# Patient Record
Sex: MALE | Race: WHITE | Employment: OTHER | URBAN - METROPOLITAN AREA
[De-identification: names, ages, dates, MRNs, and addresses within clinical notes are randomized per-mention and may not be internally consistent; named-entity substitution may affect disease eponyms.]

---

## 2022-06-04 ENCOUNTER — HOSPITAL ENCOUNTER (EMERGENCY)
Dept: CT IMAGING | Age: 82
Discharge: HOME | End: 2022-06-07
Payer: MEDICARE

## 2022-06-04 ENCOUNTER — HOSPITAL ENCOUNTER (EMERGENCY)
Age: 82
Discharge: HOME | End: 2022-06-04
Attending: EMERGENCY MEDICINE | Admitting: EMERGENCY MEDICINE
Payer: MEDICARE

## 2022-06-04 VITALS
RESPIRATION RATE: 18 BRPM | DIASTOLIC BLOOD PRESSURE: 60 MMHG | TEMPERATURE: 98.3 F | HEART RATE: 78 BPM | OXYGEN SATURATION: 95 % | SYSTOLIC BLOOD PRESSURE: 96 MMHG

## 2022-06-04 DIAGNOSIS — R31.0 GROSS HEMATURIA: Primary | ICD-10-CM

## 2022-06-04 LAB
ALBUMIN SERPL-MCNC: 3 G/DL (ref 3.2–4.6)
ALBUMIN/GLOB SERPL: 0.7 {RATIO} (ref 1.2–3.5)
ALP SERPL-CCNC: 202 U/L (ref 50–136)
ALT SERPL-CCNC: 21 U/L (ref 12–65)
ANION GAP SERPL CALC-SCNC: 5 MMOL/L (ref 7–16)
APPEARANCE UR: ABNORMAL
AST SERPL-CCNC: 24 U/L (ref 15–37)
BACTERIA URNS QL MICRO: ABNORMAL /HPF
BASOPHILS # BLD: 0 K/UL (ref 0–0.2)
BASOPHILS NFR BLD: 1 % (ref 0–2)
BILIRUB SERPL-MCNC: 1.3 MG/DL (ref 0.2–1.1)
BILIRUB UR QL: ABNORMAL
BUN SERPL-MCNC: 19 MG/DL (ref 8–23)
CALCIUM SERPL-MCNC: 9.3 MG/DL (ref 8.3–10.4)
CASTS URNS QL MICRO: ABNORMAL /LPF
CHLORIDE SERPL-SCNC: 107 MMOL/L (ref 98–107)
CO2 SERPL-SCNC: 30 MMOL/L (ref 21–32)
COLOR UR: ABNORMAL
CREAT SERPL-MCNC: 1.51 MG/DL (ref 0.8–1.5)
DIFFERENTIAL METHOD BLD: ABNORMAL
EOSINOPHIL # BLD: 0.2 K/UL (ref 0–0.8)
EOSINOPHIL NFR BLD: 3 % (ref 0.5–7.8)
EPI CELLS #/AREA URNS HPF: ABNORMAL /HPF
ERYTHROCYTE [DISTWIDTH] IN BLOOD BY AUTOMATED COUNT: 14.1 % (ref 11.9–14.6)
GLOBULIN SER CALC-MCNC: 4.1 G/DL (ref 2.3–3.5)
GLUCOSE SERPL-MCNC: 119 MG/DL (ref 65–100)
GLUCOSE UR STRIP.AUTO-MCNC: NEGATIVE MG/DL
HCT VFR BLD AUTO: 45.1 % (ref 41.1–50.3)
HGB BLD-MCNC: 14.7 G/DL (ref 13.6–17.2)
HGB UR QL STRIP: ABNORMAL
IMM GRANULOCYTES # BLD AUTO: 0 K/UL (ref 0–0.5)
IMM GRANULOCYTES NFR BLD AUTO: 0 % (ref 0–5)
KETONES UR QL STRIP.AUTO: ABNORMAL MG/DL
LEUKOCYTE ESTERASE UR QL STRIP.AUTO: ABNORMAL
LYMPHOCYTES # BLD: 1 K/UL (ref 0.5–4.6)
LYMPHOCYTES NFR BLD: 17 % (ref 13–44)
MCH RBC QN AUTO: 31.3 PG (ref 26.1–32.9)
MCHC RBC AUTO-ENTMCNC: 32.6 G/DL (ref 31.4–35)
MCV RBC AUTO: 96 FL (ref 79.6–97.8)
MONOCYTES # BLD: 0.6 K/UL (ref 0.1–1.3)
MONOCYTES NFR BLD: 11 % (ref 4–12)
NEUTS SEG # BLD: 4.2 K/UL (ref 1.7–8.2)
NEUTS SEG NFR BLD: 69 % (ref 43–78)
NITRITE UR QL STRIP.AUTO: NEGATIVE
NRBC # BLD: 0 K/UL (ref 0–0.2)
OTHER OBSERVATIONS: ABNORMAL
PH UR STRIP: 6 [PH] (ref 5–9)
PLATELET # BLD AUTO: 128 K/UL (ref 150–450)
PMV BLD AUTO: 11.8 FL (ref 9.4–12.3)
POTASSIUM SERPL-SCNC: 4.4 MMOL/L (ref 3.5–5.1)
PROT SERPL-MCNC: 7.1 G/DL (ref 6.3–8.2)
PROT UR STRIP-MCNC: 100 MG/DL
RBC # BLD AUTO: 4.7 M/UL (ref 4.23–5.6)
RBC #/AREA URNS HPF: >100 /HPF
SODIUM SERPL-SCNC: 142 MMOL/L (ref 136–145)
SP GR UR REFRACTOMETRY: 1.03 (ref 1–1.02)
UROBILINOGEN UR QL STRIP.AUTO: 0.2 EU/DL (ref 0.2–1)
WBC # BLD AUTO: 6.1 K/UL (ref 4.3–11.1)
WBC URNS QL MICRO: ABNORMAL /HPF

## 2022-06-04 PROCEDURE — 80053 COMPREHEN METABOLIC PANEL: CPT

## 2022-06-04 PROCEDURE — 87086 URINE CULTURE/COLONY COUNT: CPT

## 2022-06-04 PROCEDURE — 99284 EMERGENCY DEPT VISIT MOD MDM: CPT

## 2022-06-04 PROCEDURE — 85025 COMPLETE CBC W/AUTO DIFF WBC: CPT

## 2022-06-04 PROCEDURE — 6370000000 HC RX 637 (ALT 250 FOR IP): Performed by: EMERGENCY MEDICINE

## 2022-06-04 PROCEDURE — 74176 CT ABD & PELVIS W/O CONTRAST: CPT

## 2022-06-04 PROCEDURE — 81003 URINALYSIS AUTO W/O SCOPE: CPT

## 2022-06-04 RX ORDER — SULFAMETHOXAZOLE AND TRIMETHOPRIM 800; 160 MG/1; MG/1
1 TABLET ORAL 2 TIMES DAILY
Qty: 14 TABLET | Refills: 0 | Status: SHIPPED | OUTPATIENT
Start: 2022-06-04 | End: 2022-06-11

## 2022-06-04 RX ORDER — SULFAMETHOXAZOLE AND TRIMETHOPRIM 800; 160 MG/1; MG/1
1 TABLET ORAL
Status: COMPLETED | OUTPATIENT
Start: 2022-06-04 | End: 2022-06-04

## 2022-06-04 RX ADMIN — SULFAMETHOXAZOLE AND TRIMETHOPRIM 1 TABLET: 800; 160 TABLET ORAL at 22:32

## 2022-06-04 ASSESSMENT — PAIN SCALES - GENERAL: PAINLEVEL_OUTOF10: 0

## 2022-06-04 ASSESSMENT — ENCOUNTER SYMPTOMS
VOMITING: 0
ABDOMINAL PAIN: 0

## 2022-06-04 ASSESSMENT — PAIN - FUNCTIONAL ASSESSMENT: PAIN_FUNCTIONAL_ASSESSMENT: 0-10

## 2022-06-04 NOTE — ED TRIAGE NOTES
Pt c/p urinary frequency and hematuria , x 1 week, pt took old rx of possibly keflex  Without relief of symptoms

## 2022-06-04 NOTE — ED PROVIDER NOTES
[facility] Emergency Department Provider Note                   PCP:                Brody Crawford MD               Age: 80 y.o. Sex: male     No diagnosis found. DISPOSITION         New Prescriptions    No medications on file       Orders Placed This Encounter   Procedures    Culture, Urine    Urinalysis w rflx microscopic    CBC with Auto Differential    Comprehensive Metabolic Panel    Insert peripheral IV        MDM  Number of Diagnoses or Management Options  Diagnosis management comments: Painless hematuria and patient is on Eliquis. Possibility UTI, prostate issues, kidney stone or renal tumor or bladder tumor. Screening blood work. Discussed with family imaging. Amount and/or Complexity of Data Reviewed  Clinical lab tests: ordered and reviewed  Tests in the radiology section of CPT®: ordered and reviewed    Risk of Complications, Morbidity, and/or Mortality  Presenting problems: moderate  Diagnostic procedures: low  Management options: low         Amy Zayas is a 80 y.o. male who presents to the Emergency Department with chief complaint of    Chief Complaint   Patient presents with    Hematuria      80-year-old male brought in by daughter. Patient states dark urine and dysuria for 10 days. Suspect he had UTI.  UTI in the past.  Approximately 6 months ago he took Keflex but did not finish it. He started taking the leftover Keflex and finished a few days ago. The hematuria is persisted. No fever chills. No vomiting. No abnormal bleeding otherwise. No chest pain shortness of breath. Patient has remote history of kidney stones. Also history of renal cyst that had surgery on the left years ago. The history is provided by the patient. Hematuria  This is a new problem. The current episode started in the past 7 days. The problem is unchanged. He describes the hematuria as gross hematuria.  The hematuria occurs throughout his entire urinary stream. He reports no clotting in his urine stream. He is experiencing no pain. He describes his urine color as tea colored. Irritative symptoms include frequency. Obstructive symptoms do not include straining. Associated symptoms include dysuria. Pertinent negatives include no abdominal pain, chills, fever, flank pain, inability to urinate or vomiting. His past medical history is significant for hypertension and kidney stones. There is no history of prostatitis. All other systems reviewed and are negative. Review of Systems   Constitutional: Negative for chills and fever. Gastrointestinal: Negative for abdominal pain and vomiting. Genitourinary: Positive for dysuria, frequency and hematuria. Negative for flank pain and penile pain. All other systems reviewed and are negative.       Past Medical History:   Diagnosis Date    Abdominal pain, other specified site     Acquired cyst of kidney     Chronic kidney disease     cyst removed from L kidney 1979    Diabetes mellitus ([facility] [address]     history of \"borderline\" per pt, but no treatmentt    Dysplasia of prostate     Elevated prostate specific antigen (PSA)     Hyperlipidemia     Hypertension         Past Surgical History:   Procedure Laterality Date    HEENT      wire repair lower jaw fx  r/t trauma    LAP,CHOLECYSTECTOMY      ORTHOPEDIC SURGERY      back surgery    ORTHOPEDIC SURGERY      neck surgery    ORTHOPEDIC SURGERY      Lumbar fusion (exact # unknown)    OTHER SURGICAL HISTORY      atrial fib        Family History   Problem Relation Age of Onset    Hypertension Mother            Social Connections:     Frequency of Communication with Friends and Family: Not on file    Frequency of Social Gatherings with Friends and Family: Not on file    Attends Gnosticism Services: Not on file    Active Member of Clubs or Organizations: Not on file    Attends Club or Organization Meetings: Not on file    Marital Status: Not on file        No Known Allergies     Vitals signs and nursing note reviewed. Patient Vitals for the past 4 hrs:   Temp Pulse Resp BP SpO2   06/04/22 1824 98.3 °F (36.8 °C) 79 18 105/61 98 %          Physical Exam  Vitals and nursing note reviewed. Constitutional:       Appearance: He is not ill-appearing. Eyes:      General: No scleral icterus. Conjunctiva/sclera: Conjunctivae normal.   Cardiovascular:      Rate and Rhythm: Normal rate and regular rhythm. Pulmonary:      Effort: Pulmonary effort is normal.      Breath sounds: Normal breath sounds. Abdominal:      General: Abdomen is flat. Tenderness: There is no abdominal tenderness. There is no right CVA tenderness or left CVA tenderness. Skin:     General: Skin is warm and dry. Neurological:      Mental Status: He is alert. Procedures    Labs Reviewed   CULTURE, URINE   URINALYSIS   CBC WITH AUTO DIFFERENTIAL   COMPREHENSIVE METABOLIC PANEL        No orders to display            Elena Coma Scale  Eye Opening: Spontaneous  Best Verbal Response: Oriented  Best Motor Response: Obeys commands  [place] Coma Scale Score: 15             .edl  CT ABDOMEN PELVIS RENAL STONE Additional Contrast? None    Result Date: 6/4/2022  EXAM: Noncontrast CT abdomen and pelvis. INDICATION: Abdominal pain. COMPARISON: None. TECHNIQUE: Axial CT images of the abdomen and pelvis were obtained without IV contrast. Radiation dose reduction techniques were used for this study. Our CT scanners use one or all of the following:  Automated exposure control, adjustment of the mA or kV according to patient size, iterative reconstruction. FINDINGS: - Liver: Within normal limits. - Gallbladder and bile ducts: The gallbladder is absent. There is a small amount of pneumobilia, with no biliary tract dilatation. - Spleen: Within normal limits. - Urinary tract: Prostate gland is enlarged, measuring 5.7 x 6.4 x 5.1 cm. There is a lobulated 4 cm diverticulum along the dome of the bladder.  There are small cysts and stones in both kidneys, the largest stone on the left measuring 4 mm. No ureter stone or hydronephrosis is seen. - Adrenals: Within normal limits. - Pancreas: Within normal limits. - Gastrointestinal tract: There is colonic diverticulosis, without evidence of acute diverticulitis. The small bowel loops and appendix are within normal limits. - Retroperitoneum: Moderate abdominal aortic atherosclerotic calcification, with no aneurysmal dilatation. - Peritoneal cavity and abdominal wall: No ascites or free intraperitoneal air. - Pelvis: There is a small fat-containing right inguinal hernia. - Spine/bones: No acute process. - Other comments: None. 1. Colonic diverticulosis, without acute diverticulitis. 2. Enlarged prostate gland with a 4 cm diverticulum along the bladder dome. 3. Small bilateral kidney stones, with no ureter stone or hydronephrosis. 4. Prior cholecystectomy. A small amount of pneumobilia, without biliary tract dilatation, may be iatrogenic although cholangitis is not excluded. Voice dictation software was used during the making of this note. This software is not perfect and grammatical and other typographical errors may be present. This note has not been completely proofread for errors. Barby Forman MD  06/04/22 184  Patient will hold Eliquis for 48 hours. Antibiotics. Call urologist for appointment recheck.      Barby Forman MD  06/04/22 2018       Barby Forman MD  06/04/22 [id number]

## 2022-06-05 NOTE — ED NOTES
I have reviewed discharge instructions with the patient. The patient verbalized understanding. Patient left ED via Discharge Method: ambulatory to Home with family  Opportunity for questions and clarification provided. No acute distress present      Patient given 1 scripts. To continue your aftercare when you leave the hospital, you may receive an automated call from our care team to check in on how you are doing. This is a free service and part of our promise to provide the best care and service to meet your aftercare needs.  If you have questions, or wish to unsubscribe from this service please call [phone]. Thank you for Choosing our [facility] Emergency Department.         Lovely Story RN  06/04/22 5832

## 2022-06-07 LAB
BACTERIA SPEC CULT: NORMAL
SERVICE CMNT-IMP: NORMAL

## 2022-06-09 ENCOUNTER — OFFICE VISIT (OUTPATIENT)
Dept: UROLOGY | Age: 82
End: 2022-06-09
Payer: MEDICARE

## 2022-06-09 DIAGNOSIS — N20.0 KIDNEY STONE: Primary | ICD-10-CM

## 2022-06-09 PROCEDURE — G8421 BMI NOT CALCULATED: HCPCS | Performed by: NURSE PRACTITIONER

## 2022-06-09 PROCEDURE — 1123F ACP DISCUSS/DSCN MKR DOCD: CPT | Performed by: NURSE PRACTITIONER

## 2022-06-09 PROCEDURE — 99204 OFFICE O/P NEW MOD 45 MIN: CPT | Performed by: NURSE PRACTITIONER

## 2022-06-09 PROCEDURE — 4004F PT TOBACCO SCREEN RCVD TLK: CPT | Performed by: NURSE PRACTITIONER

## 2022-06-09 PROCEDURE — G8428 CUR MEDS NOT DOCUMENT: HCPCS | Performed by: NURSE PRACTITIONER

## 2022-06-09 RX ORDER — ATORVASTATIN CALCIUM 80 MG/1
80 TABLET, FILM COATED ORAL
COMMUNITY
Start: 2021-10-08

## 2022-06-09 RX ORDER — METOPROLOL SUCCINATE 50 MG/1
50 TABLET, EXTENDED RELEASE ORAL DAILY
COMMUNITY
Start: 2022-05-23 | End: 2023-06-03

## 2022-06-09 RX ORDER — OMEPRAZOLE 40 MG/1
40 CAPSULE, DELAYED RELEASE ORAL DAILY
COMMUNITY

## 2022-06-09 ASSESSMENT — ENCOUNTER SYMPTOMS: NAUSEA: 0

## 2022-06-09 NOTE — PROGRESS NOTES
[facility] [address] [place] [address], [address]  [phone]          Elgin Ferrell  : 1940    Chief Complaint   Patient presents with    Nephrolithiasis    Hematuria          HPI     Elgin Ferrell is a 80 y.o. male here for ER follow up due to hematuria Pt presented to ER 22. The hematuria was present throughout the entire stream. No clots. He is not having any pain. A CT was done. See impression below. CT  1. Colonic diverticulosis, without acute diverticulitis. 2. Enlarged prostate gland with a 4 cm diverticulum along the bladder dome. 3. Small bilateral kidney stones, with no ureter stone or hydronephrosis. 4. Prior cholecystectomy. A small amount of pneumobilia, without biliary tract   dilatation, may be iatrogenic although cholangitis is not excluded. Urine culture was also done. It was negative. Patient does take Eliquis due to A. fib. He takes 5 mg in the morning and in the evening. He was instructed to stop it but to restart that yesterday. He was also given a round of Bactrim DS to take for possible infection. I will just have him finish this out. He is accompanied today by his wife and his daughter. Has seen [place] in the past.  History of elevated PSA and HGPIN. Previous bx on 13 showed HGPIN in a LLM core and atypia in a LLA core. Prebx PSA was 10.04 and vol was 83g.  PSA was 6.6 on 2018 and is now 5.6 on 19.  Reports nocturia 2x per night. Relatively unbothered.  Previous history of RUP Bosniak II renal cyst that was stable on surveillance which has been discontinued.      Past Medical History:   Diagnosis Date    Abdominal pain, other specified site     Acquired cyst of kidney     Chronic kidney disease     cyst removed from L kidney     Diabetes mellitus ([facility] [address]     history of \"borderline\" per pt, but no treatmentt    Dysplasia of prostate     Elevated prostate specific antigen (PSA)     Hyperlipidemia     Hypertension      Past Surgical History:   Procedure Laterality Date    HEENT      wire repair lower jaw fx  r/t trauma    LAP,CHOLECYSTECTOMY      ORTHOPEDIC SURGERY      back surgery    ORTHOPEDIC SURGERY      neck surgery    ORTHOPEDIC SURGERY      Lumbar fusion (exact # unknown)    OTHER SURGICAL HISTORY      atrial fib     Current Outpatient Medications   Medication Sig Dispense Refill    apixaban (ELIQUIS) 5 MG TABS tablet Take 5 mg by mouth 2 times daily      atorvastatin (LIPITOR) 80 MG tablet Take 80 mg by mouth      metoprolol succinate (TOPROL XL) 50 MG extended release tablet Take 50 mg by mouth daily      omeprazole (PRILOSEC) 40 MG delayed release capsule Take 40 mg by mouth daily      sulfamethoxazole-trimethoprim (BACTRIM DS;SEPTRA DS) 800-160 MG per tablet Take 1 tablet by mouth 2 times daily for 7 days 14 tablet 0    aspirin 81 MG chewable tablet Take 81 mg by mouth daily      umeclidinium-vilanterol (ANORO ELLIPTA) 62.5-25 MCG/INH AEPB inhaler Inhale 1 puff into the lungs      famotidine (PEPCID) 20 MG tablet Take 20 mg by mouth (Patient not taking: Reported on 6/9/2022)      lisinopril (PRINIVIL;ZESTRIL) 10 MG tablet Take 10 mg by mouth (Patient not taking: Reported on 6/9/2022)      simvastatin (ZOCOR) 20 MG tablet Take by mouth (Patient not taking: Reported on 6/9/2022)       No current facility-administered medications for this visit.      No Known Allergies  Social History     Socioeconomic History    Marital status:      Spouse name: Not on file    Number of children: Not on file    Years of education: Not on file    Highest education level: Not on file   Occupational History    Not on file   Tobacco Use    Smoking status: Former Smoker    Smokeless tobacco: Never Used   Substance and Sexual Activity    Alcohol use: No    Drug use: No    Sexual activity: Not on file   Other Topics Concern    Not on file   Social History Narrative    Not on file     Social Determinants of Health     Financial Resource Strain:     Difficulty of Paying Living Expenses: Not on file   Food Insecurity:     Worried About Running Out of Food in the Last Year: Not on file    [place]y of Food in the Last Year: Not on file   Transportation Needs:     Lack of Transportation (Medical): Not on file    Lack of Transportation (Non-Medical): Not on file   Physical Activity:     Days of Exercise per Week: Not on file    Minutes of Exercise per Session: Not on file   Stress:     Feeling of Stress : Not on file   Social Connections:     Frequency of Communication with Friends and Family: Not on file    Frequency of Social Gatherings with Friends and Family: Not on file    Attends Sikh Services: Not on file    Active Member of [address] [place] [address] [employer] or Organizations: Not on file    Attends Club or Organization Meetings: Not on file    Marital Status: Not on file   Intimate Partner Violence:     Fear of Current or Ex-Partner: Not on file    Emotionally Abused: Not on file    Physically Abused: Not on file    Sexually Abused: Not on file   Housing Stability:     Unable to Pay for Housing in the Last Year: Not on file    Number of [place] in the Last Year: Not on file    Unstable Housing in the Last Year: Not on file     Family History   Problem Relation Age of Onset    Hypertension Mother        Review of Systems  Constitutional:   Negative for fever. GI:  Negative for nausea. Genitourinary: Positive for hematuria and history of urolithiasis. Urine is dark tea colored and would not run on machine. Assessment and Plan    ICD-10-CM    1. Kidney stone  N20.0 AMB POC URINALYSIS DIP STICK AUTO W/O MICRO     XR ABDOMEN (KUB) (SINGLE AP VIEW)     PLAN:  A KUB was done to evaluate for stone in the kidney. Small stone in the kidney is seen no ureteral stones. Patient will return for next available cystoscopy with Dr. Janie Lubin  I will add finasteride.    I encouraged him to increase his fluid intake. I have asked him to talk with cardiology and see when he needs to resume his Eliquis. Jennifer Thao, NP, APRN - NP  Dr. Tita Biswas is supervising physician today and he approves plan of care. Return for cysto with [place]. Elements of this note have been dictated using speech recognition software. Although reviewed, errors of speech recognition may have occurred.

## 2022-06-14 RX ORDER — FINASTERIDE 5 MG/1
5 TABLET, FILM COATED ORAL DAILY
Qty: 90 TABLET | Refills: 3 | Status: SHIPPED | OUTPATIENT
Start: 2022-06-14

## 2022-07-01 ENCOUNTER — PROCEDURE VISIT (OUTPATIENT)
Dept: UROLOGY | Age: 82
End: 2022-07-01
Payer: MEDICARE

## 2022-07-01 DIAGNOSIS — N40.1 BENIGN PROSTATIC HYPERPLASIA WITH LOWER URINARY TRACT SYMPTOMS, SYMPTOM DETAILS UNSPECIFIED: ICD-10-CM

## 2022-07-01 DIAGNOSIS — N20.0 KIDNEY STONE: Primary | ICD-10-CM

## 2022-07-01 DIAGNOSIS — R31.0 GROSS HEMATURIA: ICD-10-CM

## 2022-07-01 LAB
BILIRUBIN, URINE, POC: NORMAL
BLOOD URINE, POC: NORMAL
GLUCOSE URINE, POC: NEGATIVE
KETONES, URINE, POC: NEGATIVE
LEUKOCYTE ESTERASE, URINE, POC: NEGATIVE
NITRITE, URINE, POC: NEGATIVE
PH, URINE, POC: 5.5 (ref 4.6–8)
PROTEIN,URINE, POC: 100
SPECIFIC GRAVITY, URINE, POC: 1.03 (ref 1–1.03)
URINALYSIS CLARITY, POC: NORMAL
URINALYSIS COLOR, POC: NORMAL
UROBILINOGEN, POC: NORMAL

## 2022-07-01 PROCEDURE — 81003 URINALYSIS AUTO W/O SCOPE: CPT | Performed by: UROLOGY

## 2022-07-01 NOTE — PROGRESS NOTES
[facility]  [address] [address] [address]  [phone]    Isi Nick  : 1940         HPI   80 y.o., male presents for cystoscopy to evaluate his recurrent gross hematuria on Eliquis. Previous bx on 13 showed HGPIN in a LLM core and atypia in a LLA core. Prebx PSA was 10.04 and vol was 83g. PSA was 6.6 on 2018 and 5.6 on 19.  Reports nocturia 2x per night. Relatively unbothered.  Previous history of RUP Bosniak II renal cyst that was stable on surveillance which has been discontinued. CT on 22 shows bladder diverticula, BPH and small bilateral renal stones. He remains on Proscar.       Past Medical History:   Diagnosis Date    Abdominal pain, other specified site     Acquired cyst of kidney     Chronic kidney disease     cyst removed from L kidney     Diabetes mellitus ([facility] [address]     history of \"borderline\" per pt, but no treatmentt    Dysplasia of prostate     Elevated prostate specific antigen (PSA)     Hyperlipidemia     Hypertension      Past Surgical History:   Procedure Laterality Date    HEENT      wire repair lower jaw fx  r/t trauma    LAP,CHOLECYSTECTOMY      ORTHOPEDIC SURGERY      back surgery    ORTHOPEDIC SURGERY      neck surgery    ORTHOPEDIC SURGERY      Lumbar fusion (exact # unknown)    OTHER SURGICAL HISTORY      atrial fib     Current Outpatient Medications   Medication Sig Dispense Refill    finasteride (PROSCAR) 5 MG tablet Take 1 tablet by mouth daily 90 tablet 3    apixaban (ELIQUIS) 5 MG TABS tablet Take 5 mg by mouth 2 times daily      atorvastatin (LIPITOR) 80 MG tablet Take 80 mg by mouth      metoprolol succinate (TOPROL XL) 50 MG extended release tablet Take 50 mg by mouth daily      omeprazole (PRILOSEC) 40 MG delayed release capsule Take 40 mg by mouth daily      aspirin 81 MG chewable tablet Take 81 mg by mouth daily      lisinopril (PRINIVIL;ZESTRIL) 10 MG tablet Take 10 mg by mouth       simvastatin (ZOCOR) 20 MG tablet Take by mouth       umeclidinium-vilanterol (ANORO ELLIPTA) 62.5-25 MCG/INH AEPB inhaler Inhale 1 puff into the lungs      famotidine (PEPCID) 20 MG tablet Take 20 mg by mouth  (Patient not taking: Reported on 7/1/2022)       No current facility-administered medications for this visit. No Known Allergies  Social History     Socioeconomic History    Marital status:      Spouse name: Not on file    Number of children: Not on file    Years of education: Not on file    Highest education level: Not on file   Occupational History    Not on file   Tobacco Use    Smoking status: Former Smoker    Smokeless tobacco: Never Used   Substance and Sexual Activity    Alcohol use: No    Drug use: No    Sexual activity: Not on file   Other Topics Concern    Not on file   Social History Narrative    Not on file     Social Determinants of Health     Financial Resource Strain:     Difficulty of Paying Living Expenses: Not on file   Food Insecurity:     Worried About [employer] [employer] in the Last Year: Not on file    [address] in the Last Year: Not on file   Transportation Needs:     Lack of Transportation (Medical): Not on file    Lack of Transportation (Non-Medical):  Not on file   Physical Activity:     Days of Exercise per Week: Not on file    Minutes of Exercise per Session: Not on file   Stress:     Feeling of Stress : Not on file   Social Connections:     Frequency of Communication with Friends and Family: Not on file    Frequency of Social Gatherings with Friends and Family: Not on file    Attends Confucianist Services: Not on file    Active Member of Clubs or Organizations: Not on file    Attends Club or Organization Meetings: Not on file    Marital Status: Not on file   Intimate Partner Violence:     Fear of Current or Ex-Partner: Not on file    Emotionally Abused: Not on file    Physically Abused: Not on file    Sexually Abused: Not on file   Housing Stability:     Unable to Pay for Housing in the Last Year: Not on file    Number of Places Lived in the Last Year: Not on file    Unstable Housing in the Last Year: Not on file     Family History   Problem Relation Age of Onset    Hypertension Mother        There were no vitals taken for this visit. UA - Dipstick  Results for orders placed or performed in visit on 07/01/22   AMB POC URINALYSIS DIP STICK AUTO W/O MICRO   Result Value Ref Range    Color (UA POC)      Clarity (UA POC)      Glucose, Urine, POC Negative Negative    Bilirubin, Urine, POC Small Negative    KETONES, Urine, POC Negative Negative    Specific Gravity, Urine, POC 1.030 1.001 - 1.035    Blood (UA POC) Large Negative    pH, Urine, POC 5.5 4.6 - 8.0    Protein, Urine,   Negative    Urobilinogen, POC Normal     Nitrite, Urine, POC Negative Negative    Leukocyte Esterase, Urine, POC Negative Negative       UA - Micro  WBC - 0  RBC - 30-50  Bacteria - 0  Epith - 0          Cystoscopy Procedure:     Procedure Room # 1  Scope ID: dispo  Assistant: baltazar      All risks, benefits and alternatives were again reviewed with patient and he is willing to proceed at this time. His genital area was prepped and draped and a sterile field applied. 2% lidocaine jelly was injected in the the urethra and allowed to dwell for several minutes. A flexible cystoscope was then inserted into the urethral meatus and advanced under direct vision. The anterior and posterior urethra appeared normal in appearance. The prostatic urethra revealed trilobar hypertrophy. The bladder was systematically surveyed. Mod bladder trabeculations were seen with multiple diverticula. No mucosal abnormalities were seen. The ureteral orifices were seen in their normal orthotopic position. The cystoscope was then removed under direct vision. The patient tolerated the procedure well. Assessment and Plan    ICD-10-CM    1.  Kidney stone  N20.0 AMB POC URINALYSIS DIP STICK AUTO W/O MICRO   2. Gross hematuria  R31.0    3. Benign prostatic hyperplasia with lower urinary tract symptoms, symptom details unspecified  N40.1      Hematuria likely from prostate and anticoagulation. Will observe for time being. Reviewed option for TURP. RTO in 1 mo.     HAYDEE NUNEZ, DO